# Patient Record
Sex: MALE | Race: BLACK OR AFRICAN AMERICAN | NOT HISPANIC OR LATINO | Employment: FULL TIME | URBAN - METROPOLITAN AREA
[De-identification: names, ages, dates, MRNs, and addresses within clinical notes are randomized per-mention and may not be internally consistent; named-entity substitution may affect disease eponyms.]

---

## 2022-09-15 ENCOUNTER — ANESTHESIA (OUTPATIENT)
Dept: SURGERY | Facility: CLINIC | Age: 32
End: 2022-09-15
Payer: COMMERCIAL

## 2022-09-15 ENCOUNTER — APPOINTMENT (OUTPATIENT)
Dept: ULTRASOUND IMAGING | Facility: CLINIC | Age: 32
End: 2022-09-15
Attending: EMERGENCY MEDICINE
Payer: COMMERCIAL

## 2022-09-15 ENCOUNTER — APPOINTMENT (OUTPATIENT)
Dept: GENERAL RADIOLOGY | Facility: CLINIC | Age: 32
End: 2022-09-15
Attending: EMERGENCY MEDICINE
Payer: COMMERCIAL

## 2022-09-15 ENCOUNTER — HOSPITAL ENCOUNTER (OUTPATIENT)
Facility: CLINIC | Age: 32
Setting detail: OBSERVATION
Discharge: HOME | End: 2022-09-16
Attending: EMERGENCY MEDICINE | Admitting: INTERNAL MEDICINE
Payer: COMMERCIAL

## 2022-09-15 ENCOUNTER — ANESTHESIA EVENT (OUTPATIENT)
Dept: SURGERY | Facility: CLINIC | Age: 32
End: 2022-09-15
Payer: COMMERCIAL

## 2022-09-15 DIAGNOSIS — K80.00 CALCULUS OF GALLBLADDER WITH ACUTE CHOLECYSTITIS WITHOUT OBSTRUCTION: ICD-10-CM

## 2022-09-15 DIAGNOSIS — K80.50 BILIARY COLIC: ICD-10-CM

## 2022-09-15 DIAGNOSIS — K80.20 SYMPTOMATIC CHOLELITHIASIS: Primary | ICD-10-CM

## 2022-09-15 LAB
ALBUMIN SERPL-MCNC: 4.2 G/DL (ref 3.4–5)
ALP SERPL-CCNC: 49 U/L (ref 40–150)
ALT SERPL W P-5'-P-CCNC: 37 U/L (ref 0–70)
ANION GAP SERPL CALCULATED.3IONS-SCNC: 6 MMOL/L (ref 3–14)
AST SERPL W P-5'-P-CCNC: 20 U/L (ref 0–45)
ATRIAL RATE - MUSE: 68 BPM
BASOPHILS # BLD AUTO: 0 10E3/UL (ref 0–0.2)
BASOPHILS NFR BLD AUTO: 0 %
BILIRUB SERPL-MCNC: 0.6 MG/DL (ref 0.2–1.3)
BUN SERPL-MCNC: 17 MG/DL (ref 7–30)
CALCIUM SERPL-MCNC: 9.6 MG/DL (ref 8.5–10.1)
CHLORIDE BLD-SCNC: 105 MMOL/L (ref 94–109)
CO2 SERPL-SCNC: 27 MMOL/L (ref 20–32)
CREAT SERPL-MCNC: 1.1 MG/DL (ref 0.66–1.25)
DIASTOLIC BLOOD PRESSURE - MUSE: NORMAL MMHG
EOSINOPHIL # BLD AUTO: 0.1 10E3/UL (ref 0–0.7)
EOSINOPHIL NFR BLD AUTO: 1 %
ERYTHROCYTE [DISTWIDTH] IN BLOOD BY AUTOMATED COUNT: 12.6 % (ref 10–15)
GFR SERPL CREATININE-BSD FRML MDRD: >90 ML/MIN/1.73M2
GLUCOSE BLD-MCNC: 119 MG/DL (ref 70–99)
HCT VFR BLD AUTO: 42.7 % (ref 40–53)
HGB BLD-MCNC: 14.3 G/DL (ref 13.3–17.7)
IMM GRANULOCYTES # BLD: 0 10E3/UL
IMM GRANULOCYTES NFR BLD: 0 %
INTERPRETATION ECG - MUSE: NORMAL
LIPASE SERPL-CCNC: 108 U/L (ref 73–393)
LYMPHOCYTES # BLD AUTO: 2.8 10E3/UL (ref 0.8–5.3)
LYMPHOCYTES NFR BLD AUTO: 49 %
MCH RBC QN AUTO: 26 PG (ref 26.5–33)
MCHC RBC AUTO-ENTMCNC: 33.5 G/DL (ref 31.5–36.5)
MCV RBC AUTO: 78 FL (ref 78–100)
MONOCYTES # BLD AUTO: 0.4 10E3/UL (ref 0–1.3)
MONOCYTES NFR BLD AUTO: 6 %
NEUTROPHILS # BLD AUTO: 2.6 10E3/UL (ref 1.6–8.3)
NEUTROPHILS NFR BLD AUTO: 44 %
NRBC # BLD AUTO: 0 10E3/UL
NRBC BLD AUTO-RTO: 0 /100
P AXIS - MUSE: 49 DEGREES
PLATELET # BLD AUTO: 240 10E3/UL (ref 150–450)
POTASSIUM BLD-SCNC: 3.6 MMOL/L (ref 3.4–5.3)
PR INTERVAL - MUSE: 170 MS
PROT SERPL-MCNC: 7.5 G/DL (ref 6.8–8.8)
QRS DURATION - MUSE: 144 MS
QT - MUSE: 422 MS
QTC - MUSE: 448 MS
R AXIS - MUSE: 70 DEGREES
RBC # BLD AUTO: 5.5 10E6/UL (ref 4.4–5.9)
SARS-COV-2 RNA RESP QL NAA+PROBE: NEGATIVE
SODIUM SERPL-SCNC: 138 MMOL/L (ref 133–144)
SYSTOLIC BLOOD PRESSURE - MUSE: NORMAL MMHG
T AXIS - MUSE: 42 DEGREES
TROPONIN I SERPL HS-MCNC: <3 NG/L
VENTRICULAR RATE- MUSE: 68 BPM
WBC # BLD AUTO: 5.9 10E3/UL (ref 4–11)

## 2022-09-15 PROCEDURE — 88304 TISSUE EXAM BY PATHOLOGIST: CPT | Mod: TC | Performed by: SURGERY

## 2022-09-15 PROCEDURE — 250N000009 HC RX 250: Performed by: SURGERY

## 2022-09-15 PROCEDURE — 83690 ASSAY OF LIPASE: CPT | Performed by: EMERGENCY MEDICINE

## 2022-09-15 PROCEDURE — 96374 THER/PROPH/DIAG INJ IV PUSH: CPT | Mod: XU

## 2022-09-15 PROCEDURE — 36415 COLL VENOUS BLD VENIPUNCTURE: CPT | Performed by: EMERGENCY MEDICINE

## 2022-09-15 PROCEDURE — 71046 X-RAY EXAM CHEST 2 VIEWS: CPT

## 2022-09-15 PROCEDURE — 999N000141 HC STATISTIC PRE-PROCEDURE NURSING ASSESSMENT: Performed by: SURGERY

## 2022-09-15 PROCEDURE — 250N000013 HC RX MED GY IP 250 OP 250 PS 637: Performed by: NURSE ANESTHETIST, CERTIFIED REGISTERED

## 2022-09-15 PROCEDURE — 47562 LAPAROSCOPIC CHOLECYSTECTOMY: CPT | Mod: AS | Performed by: PHYSICIAN ASSISTANT

## 2022-09-15 PROCEDURE — 258N000003 HC RX IP 258 OP 636: Performed by: ANESTHESIOLOGY

## 2022-09-15 PROCEDURE — 258N000001 HC RX 258: Performed by: SURGERY

## 2022-09-15 PROCEDURE — 250N000009 HC RX 250: Performed by: NURSE ANESTHETIST, CERTIFIED REGISTERED

## 2022-09-15 PROCEDURE — 258N000003 HC RX IP 258 OP 636: Performed by: EMERGENCY MEDICINE

## 2022-09-15 PROCEDURE — 272N000001 HC OR GENERAL SUPPLY STERILE: Performed by: SURGERY

## 2022-09-15 PROCEDURE — 258N000003 HC RX IP 258 OP 636: Performed by: INTERNAL MEDICINE

## 2022-09-15 PROCEDURE — 370N000017 HC ANESTHESIA TECHNICAL FEE, PER MIN: Performed by: SURGERY

## 2022-09-15 PROCEDURE — 82040 ASSAY OF SERUM ALBUMIN: CPT | Performed by: EMERGENCY MEDICINE

## 2022-09-15 PROCEDURE — G0378 HOSPITAL OBSERVATION PER HR: HCPCS

## 2022-09-15 PROCEDURE — 47562 LAPAROSCOPIC CHOLECYSTECTOMY: CPT | Performed by: SURGERY

## 2022-09-15 PROCEDURE — 710N000009 HC RECOVERY PHASE 1, LEVEL 1, PER MIN: Performed by: SURGERY

## 2022-09-15 PROCEDURE — 250N000011 HC RX IP 250 OP 636: Performed by: NURSE ANESTHETIST, CERTIFIED REGISTERED

## 2022-09-15 PROCEDURE — 258N000003 HC RX IP 258 OP 636: Performed by: NURSE ANESTHETIST, CERTIFIED REGISTERED

## 2022-09-15 PROCEDURE — 99219 PR INITIAL OBSERVATION CARE,LEVEL II: CPT | Performed by: INTERNAL MEDICINE

## 2022-09-15 PROCEDURE — 250N000013 HC RX MED GY IP 250 OP 250 PS 637: Performed by: INTERNAL MEDICINE

## 2022-09-15 PROCEDURE — 85025 COMPLETE CBC W/AUTO DIFF WBC: CPT | Performed by: EMERGENCY MEDICINE

## 2022-09-15 PROCEDURE — 84484 ASSAY OF TROPONIN QUANT: CPT | Performed by: EMERGENCY MEDICINE

## 2022-09-15 PROCEDURE — 250N000011 HC RX IP 250 OP 636: Performed by: ANESTHESIOLOGY

## 2022-09-15 PROCEDURE — 250N000011 HC RX IP 250 OP 636: Performed by: EMERGENCY MEDICINE

## 2022-09-15 PROCEDURE — 250N000011 HC RX IP 250 OP 636: Performed by: PHYSICIAN ASSISTANT

## 2022-09-15 PROCEDURE — 96376 TX/PRO/DX INJ SAME DRUG ADON: CPT

## 2022-09-15 PROCEDURE — 96375 TX/PRO/DX INJ NEW DRUG ADDON: CPT

## 2022-09-15 PROCEDURE — 76705 ECHO EXAM OF ABDOMEN: CPT

## 2022-09-15 PROCEDURE — 360N000076 HC SURGERY LEVEL 3, PER MIN: Performed by: SURGERY

## 2022-09-15 PROCEDURE — 99207 PR NO CHARGE LOS: CPT | Performed by: INTERNAL MEDICINE

## 2022-09-15 PROCEDURE — 93005 ELECTROCARDIOGRAM TRACING: CPT | Mod: XU

## 2022-09-15 PROCEDURE — 250N000025 HC SEVOFLURANE, PER MIN: Performed by: SURGERY

## 2022-09-15 PROCEDURE — 250N000011 HC RX IP 250 OP 636: Performed by: INTERNAL MEDICINE

## 2022-09-15 PROCEDURE — 99204 OFFICE O/P NEW MOD 45 MIN: CPT | Mod: 57 | Performed by: SURGERY

## 2022-09-15 PROCEDURE — C9803 HOPD COVID-19 SPEC COLLECT: HCPCS

## 2022-09-15 PROCEDURE — 80053 COMPREHEN METABOLIC PANEL: CPT | Performed by: EMERGENCY MEDICINE

## 2022-09-15 PROCEDURE — U0003 INFECTIOUS AGENT DETECTION BY NUCLEIC ACID (DNA OR RNA); SEVERE ACUTE RESPIRATORY SYNDROME CORONAVIRUS 2 (SARS-COV-2) (CORONAVIRUS DISEASE [COVID-19]), AMPLIFIED PROBE TECHNIQUE, MAKING USE OF HIGH THROUGHPUT TECHNOLOGIES AS DESCRIBED BY CMS-2020-01-R: HCPCS | Performed by: INTERNAL MEDICINE

## 2022-09-15 PROCEDURE — 99285 EMERGENCY DEPT VISIT HI MDM: CPT | Mod: 25

## 2022-09-15 PROCEDURE — 96361 HYDRATE IV INFUSION ADD-ON: CPT | Mod: XU

## 2022-09-15 RX ORDER — ALBUTEROL SULFATE 90 UG/1
AEROSOL, METERED RESPIRATORY (INHALATION) PRN
Status: DISCONTINUED | OUTPATIENT
Start: 2022-09-15 | End: 2022-09-15

## 2022-09-15 RX ORDER — GLYCOPYRROLATE 0.2 MG/ML
INJECTION, SOLUTION INTRAMUSCULAR; INTRAVENOUS PRN
Status: DISCONTINUED | OUTPATIENT
Start: 2022-09-15 | End: 2022-09-15

## 2022-09-15 RX ORDER — ONDANSETRON 4 MG/1
4 TABLET, ORALLY DISINTEGRATING ORAL EVERY 30 MIN PRN
Status: DISCONTINUED | OUTPATIENT
Start: 2022-09-15 | End: 2022-09-15 | Stop reason: HOSPADM

## 2022-09-15 RX ORDER — MAGNESIUM HYDROXIDE 1200 MG/15ML
LIQUID ORAL PRN
Status: DISCONTINUED | OUTPATIENT
Start: 2022-09-15 | End: 2022-09-15 | Stop reason: HOSPADM

## 2022-09-15 RX ORDER — NALOXONE HYDROCHLORIDE 0.4 MG/ML
0.4 INJECTION, SOLUTION INTRAMUSCULAR; INTRAVENOUS; SUBCUTANEOUS
Status: DISCONTINUED | OUTPATIENT
Start: 2022-09-15 | End: 2022-09-16 | Stop reason: HOSPADM

## 2022-09-15 RX ORDER — OXYCODONE HYDROCHLORIDE 5 MG/1
5 TABLET ORAL EVERY 4 HOURS PRN
Status: DISCONTINUED | OUTPATIENT
Start: 2022-09-15 | End: 2022-09-15 | Stop reason: HOSPADM

## 2022-09-15 RX ORDER — ACETAMINOPHEN 325 MG/1
650 TABLET ORAL EVERY 6 HOURS PRN
Status: DISCONTINUED | OUTPATIENT
Start: 2022-09-15 | End: 2022-09-16 | Stop reason: HOSPADM

## 2022-09-15 RX ORDER — FENTANYL CITRATE 50 UG/ML
25 INJECTION, SOLUTION INTRAMUSCULAR; INTRAVENOUS EVERY 5 MIN PRN
Status: DISCONTINUED | OUTPATIENT
Start: 2022-09-15 | End: 2022-09-15 | Stop reason: HOSPADM

## 2022-09-15 RX ORDER — HYDROMORPHONE HCL IN WATER/PF 6 MG/30 ML
0.4 PATIENT CONTROLLED ANALGESIA SYRINGE INTRAVENOUS EVERY 5 MIN PRN
Status: DISCONTINUED | OUTPATIENT
Start: 2022-09-15 | End: 2022-09-15 | Stop reason: HOSPADM

## 2022-09-15 RX ORDER — DIPHENHYDRAMINE HYDROCHLORIDE 50 MG/ML
25 INJECTION INTRAMUSCULAR; INTRAVENOUS ONCE
Status: COMPLETED | OUTPATIENT
Start: 2022-09-15 | End: 2022-09-15

## 2022-09-15 RX ORDER — ASPIRIN 81 MG/1
81 TABLET, CHEWABLE ORAL DAILY PRN
COMMUNITY

## 2022-09-15 RX ORDER — ONDANSETRON 2 MG/ML
4 INJECTION INTRAMUSCULAR; INTRAVENOUS EVERY 30 MIN PRN
Status: DISCONTINUED | OUTPATIENT
Start: 2022-09-15 | End: 2022-09-15

## 2022-09-15 RX ORDER — CEFAZOLIN SODIUM/WATER 2 G/20 ML
2 SYRINGE (ML) INTRAVENOUS
Status: COMPLETED | OUTPATIENT
Start: 2022-09-15 | End: 2022-09-15

## 2022-09-15 RX ORDER — HYDROMORPHONE HYDROCHLORIDE 1 MG/ML
0.5 INJECTION, SOLUTION INTRAMUSCULAR; INTRAVENOUS; SUBCUTANEOUS
Status: COMPLETED | OUTPATIENT
Start: 2022-09-15 | End: 2022-09-15

## 2022-09-15 RX ORDER — SODIUM CHLORIDE, SODIUM LACTATE, POTASSIUM CHLORIDE, CALCIUM CHLORIDE 600; 310; 30; 20 MG/100ML; MG/100ML; MG/100ML; MG/100ML
INJECTION, SOLUTION INTRAVENOUS CONTINUOUS
Status: DISCONTINUED | OUTPATIENT
Start: 2022-09-15 | End: 2022-09-15 | Stop reason: HOSPADM

## 2022-09-15 RX ORDER — METOCLOPRAMIDE HYDROCHLORIDE 5 MG/ML
10 INJECTION INTRAMUSCULAR; INTRAVENOUS ONCE
Status: COMPLETED | OUTPATIENT
Start: 2022-09-15 | End: 2022-09-15

## 2022-09-15 RX ORDER — NEOSTIGMINE METHYLSULFATE 1 MG/ML
VIAL (ML) INJECTION PRN
Status: DISCONTINUED | OUTPATIENT
Start: 2022-09-15 | End: 2022-09-15

## 2022-09-15 RX ORDER — PROPOFOL 10 MG/ML
INJECTION, EMULSION INTRAVENOUS PRN
Status: DISCONTINUED | OUTPATIENT
Start: 2022-09-15 | End: 2022-09-15

## 2022-09-15 RX ORDER — SODIUM CHLORIDE 9 MG/ML
INJECTION, SOLUTION INTRAVENOUS CONTINUOUS
Status: DISCONTINUED | OUTPATIENT
Start: 2022-09-15 | End: 2022-09-15

## 2022-09-15 RX ORDER — ACETAMINOPHEN 650 MG/1
650 SUPPOSITORY RECTAL EVERY 6 HOURS PRN
Status: DISCONTINUED | OUTPATIENT
Start: 2022-09-15 | End: 2022-09-16 | Stop reason: HOSPADM

## 2022-09-15 RX ORDER — ONDANSETRON 2 MG/ML
INJECTION INTRAMUSCULAR; INTRAVENOUS PRN
Status: DISCONTINUED | OUTPATIENT
Start: 2022-09-15 | End: 2022-09-15

## 2022-09-15 RX ORDER — DEXAMETHASONE SODIUM PHOSPHATE 4 MG/ML
INJECTION, SOLUTION INTRA-ARTICULAR; INTRALESIONAL; INTRAMUSCULAR; INTRAVENOUS; SOFT TISSUE PRN
Status: DISCONTINUED | OUTPATIENT
Start: 2022-09-15 | End: 2022-09-15

## 2022-09-15 RX ORDER — CEFAZOLIN SODIUM/WATER 2 G/20 ML
2 SYRINGE (ML) INTRAVENOUS SEE ADMIN INSTRUCTIONS
Status: DISCONTINUED | OUTPATIENT
Start: 2022-09-15 | End: 2022-09-15 | Stop reason: HOSPADM

## 2022-09-15 RX ORDER — OXYCODONE HYDROCHLORIDE 5 MG/1
5-10 TABLET ORAL EVERY 6 HOURS PRN
Status: DISCONTINUED | OUTPATIENT
Start: 2022-09-15 | End: 2022-09-16 | Stop reason: HOSPADM

## 2022-09-15 RX ORDER — ONDANSETRON 2 MG/ML
4 INJECTION INTRAMUSCULAR; INTRAVENOUS EVERY 6 HOURS PRN
Status: DISCONTINUED | OUTPATIENT
Start: 2022-09-15 | End: 2022-09-16 | Stop reason: HOSPADM

## 2022-09-15 RX ORDER — OXYCODONE HYDROCHLORIDE 5 MG/1
5 TABLET ORAL EVERY 4 HOURS PRN
Qty: 10 TABLET | Refills: 0 | Status: SHIPPED | OUTPATIENT
Start: 2022-09-15

## 2022-09-15 RX ORDER — LABETALOL HYDROCHLORIDE 5 MG/ML
10 INJECTION, SOLUTION INTRAVENOUS
Status: DISCONTINUED | OUTPATIENT
Start: 2022-09-15 | End: 2022-09-15 | Stop reason: HOSPADM

## 2022-09-15 RX ORDER — PROCHLORPERAZINE MALEATE 10 MG
10 TABLET ORAL EVERY 6 HOURS PRN
Status: DISCONTINUED | OUTPATIENT
Start: 2022-09-15 | End: 2022-09-16 | Stop reason: HOSPADM

## 2022-09-15 RX ORDER — PROCHLORPERAZINE 25 MG
25 SUPPOSITORY, RECTAL RECTAL EVERY 12 HOURS PRN
Status: DISCONTINUED | OUTPATIENT
Start: 2022-09-15 | End: 2022-09-16 | Stop reason: HOSPADM

## 2022-09-15 RX ORDER — ALBUTEROL SULFATE 90 UG/1
2 AEROSOL, METERED RESPIRATORY (INHALATION) EVERY 6 HOURS PRN
Status: DISCONTINUED | OUTPATIENT
Start: 2022-09-15 | End: 2022-09-16 | Stop reason: HOSPADM

## 2022-09-15 RX ORDER — NALOXONE HYDROCHLORIDE 0.4 MG/ML
0.2 INJECTION, SOLUTION INTRAMUSCULAR; INTRAVENOUS; SUBCUTANEOUS
Status: DISCONTINUED | OUTPATIENT
Start: 2022-09-15 | End: 2022-09-16 | Stop reason: HOSPADM

## 2022-09-15 RX ORDER — ASPIRIN 81 MG/1
81 TABLET, CHEWABLE ORAL DAILY PRN
Status: DISCONTINUED | OUTPATIENT
Start: 2022-09-15 | End: 2022-09-16 | Stop reason: HOSPADM

## 2022-09-15 RX ORDER — ONDANSETRON 2 MG/ML
4 INJECTION INTRAMUSCULAR; INTRAVENOUS EVERY 30 MIN PRN
Status: DISCONTINUED | OUTPATIENT
Start: 2022-09-15 | End: 2022-09-15 | Stop reason: HOSPADM

## 2022-09-15 RX ORDER — AMOXICILLIN 250 MG
1 CAPSULE ORAL 2 TIMES DAILY PRN
Status: DISCONTINUED | OUTPATIENT
Start: 2022-09-15 | End: 2022-09-16 | Stop reason: HOSPADM

## 2022-09-15 RX ORDER — ALBUTEROL SULFATE 90 UG/1
2 AEROSOL, METERED RESPIRATORY (INHALATION) EVERY 6 HOURS PRN
COMMUNITY

## 2022-09-15 RX ORDER — FENTANYL CITRATE 50 UG/ML
INJECTION, SOLUTION INTRAMUSCULAR; INTRAVENOUS PRN
Status: DISCONTINUED | OUTPATIENT
Start: 2022-09-15 | End: 2022-09-15

## 2022-09-15 RX ORDER — AMOXICILLIN 250 MG
2 CAPSULE ORAL 2 TIMES DAILY PRN
Status: DISCONTINUED | OUTPATIENT
Start: 2022-09-15 | End: 2022-09-16 | Stop reason: HOSPADM

## 2022-09-15 RX ORDER — HYDRALAZINE HYDROCHLORIDE 20 MG/ML
2.5-5 INJECTION INTRAMUSCULAR; INTRAVENOUS EVERY 10 MIN PRN
Status: DISCONTINUED | OUTPATIENT
Start: 2022-09-15 | End: 2022-09-15 | Stop reason: HOSPADM

## 2022-09-15 RX ORDER — HYDROMORPHONE HYDROCHLORIDE 1 MG/ML
0.5 INJECTION, SOLUTION INTRAMUSCULAR; INTRAVENOUS; SUBCUTANEOUS
Status: DISCONTINUED | OUTPATIENT
Start: 2022-09-15 | End: 2022-09-16 | Stop reason: HOSPADM

## 2022-09-15 RX ORDER — ELECTROLYTES/DEXTROSE
1 SOLUTION, ORAL ORAL DAILY
COMMUNITY

## 2022-09-15 RX ORDER — LIDOCAINE HYDROCHLORIDE 20 MG/ML
INJECTION, SOLUTION INFILTRATION; PERINEURAL PRN
Status: DISCONTINUED | OUTPATIENT
Start: 2022-09-15 | End: 2022-09-15

## 2022-09-15 RX ORDER — BUPIVACAINE HYDROCHLORIDE AND EPINEPHRINE 2.5; 5 MG/ML; UG/ML
INJECTION, SOLUTION INFILTRATION; PERINEURAL PRN
Status: DISCONTINUED | OUTPATIENT
Start: 2022-09-15 | End: 2022-09-15 | Stop reason: HOSPADM

## 2022-09-15 RX ADMIN — HYDROMORPHONE HYDROCHLORIDE 0.5 MG: 1 INJECTION, SOLUTION INTRAMUSCULAR; INTRAVENOUS; SUBCUTANEOUS at 18:04

## 2022-09-15 RX ADMIN — LIDOCAINE HYDROCHLORIDE 100 MG: 20 INJECTION, SOLUTION INFILTRATION; PERINEURAL at 13:12

## 2022-09-15 RX ADMIN — PROPOFOL 25 MG: 10 INJECTION, EMULSION INTRAVENOUS at 13:12

## 2022-09-15 RX ADMIN — MIDAZOLAM 2 MG: 1 INJECTION INTRAMUSCULAR; INTRAVENOUS at 13:06

## 2022-09-15 RX ADMIN — Medication 2 G: at 13:06

## 2022-09-15 RX ADMIN — NEOSTIGMINE METHYLSULFATE 5 MG: 1 INJECTION, SOLUTION INTRAVENOUS at 13:59

## 2022-09-15 RX ADMIN — ALBUTEROL SULFATE 6 PUFF: 90 AEROSOL, METERED RESPIRATORY (INHALATION) at 13:58

## 2022-09-15 RX ADMIN — FAMOTIDINE 20 MG: 10 INJECTION INTRAVENOUS at 03:59

## 2022-09-15 RX ADMIN — SODIUM CHLORIDE 1000 ML: 9 INJECTION, SOLUTION INTRAVENOUS at 04:01

## 2022-09-15 RX ADMIN — DEXAMETHASONE SODIUM PHOSPHATE 4 MG: 4 INJECTION, SOLUTION INTRA-ARTICULAR; INTRALESIONAL; INTRAMUSCULAR; INTRAVENOUS; SOFT TISSUE at 13:20

## 2022-09-15 RX ADMIN — ONDANSETRON 4 MG: 2 INJECTION INTRAMUSCULAR; INTRAVENOUS at 04:00

## 2022-09-15 RX ADMIN — ONDANSETRON 4 MG: 2 INJECTION INTRAMUSCULAR; INTRAVENOUS at 13:50

## 2022-09-15 RX ADMIN — HYDROMORPHONE HYDROCHLORIDE 0.5 MG: 1 INJECTION, SOLUTION INTRAMUSCULAR; INTRAVENOUS; SUBCUTANEOUS at 05:36

## 2022-09-15 RX ADMIN — FENTANYL CITRATE 50 MCG: 50 INJECTION, SOLUTION INTRAMUSCULAR; INTRAVENOUS at 13:37

## 2022-09-15 RX ADMIN — PHENYLEPHRINE HYDROCHLORIDE 100 MCG: 10 INJECTION INTRAVENOUS at 13:44

## 2022-09-15 RX ADMIN — FENTANYL CITRATE 25 MCG: 50 INJECTION, SOLUTION INTRAMUSCULAR; INTRAVENOUS at 14:54

## 2022-09-15 RX ADMIN — SODIUM CHLORIDE, POTASSIUM CHLORIDE, SODIUM LACTATE AND CALCIUM CHLORIDE: 600; 310; 30; 20 INJECTION, SOLUTION INTRAVENOUS at 13:06

## 2022-09-15 RX ADMIN — DIPHENHYDRAMINE HYDROCHLORIDE 25 MG: 50 INJECTION, SOLUTION INTRAMUSCULAR; INTRAVENOUS at 06:42

## 2022-09-15 RX ADMIN — FENTANYL CITRATE 25 MCG: 50 INJECTION, SOLUTION INTRAMUSCULAR; INTRAVENOUS at 14:45

## 2022-09-15 RX ADMIN — FENTANYL CITRATE 50 MCG: 50 INJECTION, SOLUTION INTRAMUSCULAR; INTRAVENOUS at 13:12

## 2022-09-15 RX ADMIN — METOCLOPRAMIDE 10 MG: 5 INJECTION, SOLUTION INTRAMUSCULAR; INTRAVENOUS at 06:42

## 2022-09-15 RX ADMIN — HYDROMORPHONE HYDROCHLORIDE 0.5 MG: 1 INJECTION, SOLUTION INTRAMUSCULAR; INTRAVENOUS; SUBCUTANEOUS at 04:00

## 2022-09-15 RX ADMIN — ROCURONIUM BROMIDE 50 MG: 50 INJECTION, SOLUTION INTRAVENOUS at 13:12

## 2022-09-15 RX ADMIN — GLYCOPYRROLATE 0.8 MG: 0.2 INJECTION, SOLUTION INTRAMUSCULAR; INTRAVENOUS at 13:59

## 2022-09-15 RX ADMIN — HYDROMORPHONE HYDROCHLORIDE 0.5 MG: 1 INJECTION, SOLUTION INTRAMUSCULAR; INTRAVENOUS; SUBCUTANEOUS at 04:27

## 2022-09-15 RX ADMIN — SODIUM CHLORIDE, POTASSIUM CHLORIDE, SODIUM LACTATE AND CALCIUM CHLORIDE: 600; 310; 30; 20 INJECTION, SOLUTION INTRAVENOUS at 13:40

## 2022-09-15 RX ADMIN — PHENYLEPHRINE HYDROCHLORIDE 100 MCG: 10 INJECTION INTRAVENOUS at 13:45

## 2022-09-15 RX ADMIN — SODIUM CHLORIDE: 9 INJECTION, SOLUTION INTRAVENOUS at 07:47

## 2022-09-15 RX ADMIN — ACETAMINOPHEN 650 MG: 325 TABLET, FILM COATED ORAL at 22:18

## 2022-09-15 RX ADMIN — PHENYLEPHRINE HYDROCHLORIDE 100 MCG: 10 INJECTION INTRAVENOUS at 13:43

## 2022-09-15 RX ADMIN — ONDANSETRON 4 MG: 2 INJECTION INTRAMUSCULAR; INTRAVENOUS at 04:27

## 2022-09-15 ASSESSMENT — ACTIVITIES OF DAILY LIVING (ADL)
ADLS_ACUITY_SCORE: 35

## 2022-09-15 ASSESSMENT — ENCOUNTER SYMPTOMS: ABDOMINAL PAIN: 1

## 2022-09-15 ASSESSMENT — LIFESTYLE VARIABLES: TOBACCO_USE: 0

## 2022-09-15 NOTE — OP NOTE
General Surgery Operative Note    PREOPERATIVE DIAGNOSIS:  Symptomatic cholelithiasis [K80.20]    POSTOPERATIVE DIAGNOSIS:  Same, acute cholecystitis    PROCEDURE:  CHOLECYSTECTOMY, LAPAROSCOPIC    ANESTHESIA:  General.    PREOPERATIVE MEDICATIONS:  Ancef IV.    SURGEON:  Juan Smith MD    ASSISTANT:  Kanu Martinez PA-C, Physician assistant first assistant was necessary during the performance of this procedure for expertise in patient positioning, prepping, draping, trocar placement, camera management, retraction and exposure, and suctioning.    INDICATIONS: Patient presented with signs and symptoms consistent with biliary colic and possible cholecystitis.  Extensive discussion was undertaken regarding the procedure of cholecystectomy.  The potential risks of bleeding, infection, bile duct or visceral injury were thoroughly reviewed.  All of the patient's questions were answered.  The patient wishes to proceed with cholecystectomy.    PROCEDURE:  After informed consent was obtained the patient was taken to the operating suite and uneventfully endotracheally intubated.  The abdomen was prepped and draped in a sterile fashion.  Surgeon initiated timeout was acknowledged.  After infiltration with local anesthestic a curvilinear incision was made in the infraumbilical position and through this a Verees needle was passed intraperitoneally.  Position was confirmed using the saline drop test. A CO2 pneumoperitoneum was then created.  After adequate insufflation the needle was removed and replaced with an 11mm trocar .  Two other trocars were placed under laparoscopic visualization following the infiltration of local anesthetic.  We elevated the liver and were able to identify the gallbladder.  The gallbladder was tense and distended.  An aspirating needle and syringe were used to decompress it.  There were obvious signs of acute cholecystitis with significant surrounding tissue edema.  The gallbladder was grasped  and used to elevate the liver further.  I began dissecting out some fatty adhesions down near the neck of the gallbladder until a cystic duct was encountered.  I continued the dissection using combination of sharp and blunt dissection until the cystic duct was largely dissected out.  I continued the dissection up along the sides of the gallbladder, both medially and laterally, until I had created a space between the gallbladder and the liver.  At this point, I encountered the cystic artery, just posterior and lateral to the cystic duct.  This again was dissected out.  Once I had created a window where only the cystic artery and duct were noted to be entering the gallbladder, I felt that this represented our critical view.  The cystic artery and duct were then doubly clipped and divided.  I continued the dissection up along the body of the gallbladder, freeing all attachments and adhesions of the gallbladder to the liver.  Gallbladder was removed from the liver in an atraumatic fashion.  The gallbladder was then placed in a retrieval pouch and removed from the abdomen.  The gallbladder fossa was reinspected, and all areas of bleeding were managed with electrocautery.  I irrigated the area with normal saline and aspirated it out.  I then reinspected the abdomen, and everything appeared to be in pristine condition. The trocars were removed and carbon dioxide was massaged from the abdomen. Local anesthetic was injected. Fascia at the 11mm port site was closed with 0 vicryl suture.  Skin incisions were closed with subcuticular 4-0 Monocryl sutures.  The patient tolerated this procedure without difficulty. Needle and sponge counts were correct. The patient was taken from the operating room To the recovery room in a stable condition.      ESTIMATED BLOOD LOSS: 10cc    Juan Smith MD

## 2022-09-15 NOTE — ANESTHESIA PREPROCEDURE EVALUATION
Anesthesia Pre-Procedure Evaluation    Patient: Bryn Power   MRN: [id number] : 1990        Procedure : Procedure(s):  CHOLECYSTECTOMY, LAPAROSCOPIC          Past Medical History:   Diagnosis Date     Uncomplicated asthma       History reviewed. No pertinent surgical history.   No Known Allergies   Social History     Tobacco Use     Smoking status: Never Smoker     Smokeless tobacco: Never Used   Substance Use Topics     Alcohol use: Yes      Wt Readings from Last 1 Encounters:   09/15/22 97.1 kg (214 lb)        Anesthesia Evaluation   Pt has not had prior anesthetic         ROS/MED HX  ENT/Pulmonary:     (+) asthma Treatment: Inhaler prn,   (-) tobacco use and sleep apnea   Neurologic:  - neg neurologic ROS     Cardiovascular:  - neg cardiovascular ROS     METS/Exercise Tolerance:     Hematologic:       Musculoskeletal:       GI/Hepatic:     (+) cholecystitis/cholelithiasis,  (-) GERD   Renal/Genitourinary:  - neg Renal ROS     Endo:     (+) Obesity,  (-) Type II DM   Psychiatric/Substance Use:       Infectious Disease:       Malignancy:       Other:            Physical Exam    Airway        Mallampati: III   TM distance: > 3 FB   Neck ROM: full   Mouth opening: > 3 cm    Respiratory Devices and Support         Dental  no notable dental history         Cardiovascular   cardiovascular exam normal          Pulmonary   pulmonary exam normal                OUTSIDE LABS:  CBC:   Lab Results   Component Value Date    WBC 5.9 09/15/2022    HGB 14.3 09/15/2022    HCT 42.7 09/15/2022     09/15/2022     BMP:   Lab Results   Component Value Date     09/15/2022    POTASSIUM 3.6 09/15/2022    CHLORIDE 105 09/15/2022    CO2 27 09/15/2022    BUN 17 09/15/2022    CR 1.10 09/15/2022     (H) 09/15/2022     COAGS: No results found for: PTT, INR, FIBR  POC: No results found for: BGM, HCG, HCGS  HEPATIC:   Lab Results   Component Value Date    ALBUMIN 4.2 09/15/2022    PROTTOTAL 7.5 09/15/2022     ALT 37 09/15/2022    AST 20 09/15/2022    ALKPHOS 49 09/15/2022    BILITOTAL 0.6 09/15/2022     OTHER:   Lab Results   Component Value Date    GO 9.6 09/15/2022    LIPASE 108 09/15/2022       Anesthesia Plan    ASA Status:  2   NPO Status:  NPO Appropriate    Anesthesia Type: General.     - Airway: ETT   Induction: Intravenous, Propofol.   Maintenance: Balanced.        Consents    Anesthesia Plan(s) and associated risks, benefits, and realistic alternatives discussed. Questions answered and patient/representative(s) expressed understanding.    - Discussed:     - Discussed with:  Patient         Postoperative Care    Pain management: IV analgesics.   PONV prophylaxis: Ondansetron (or other 5HT-3), Background Propofol Infusion     Comments:                Trudi Mills MD

## 2022-09-15 NOTE — PROGRESS NOTES
RECEIVING UNIT ED HANDOFF REVIEW    ED Nurse Handoff Report was reviewed by: Lata Brunson RN on September 15, 2022 at 9:54 AM

## 2022-09-15 NOTE — INTERVAL H&P NOTE
"I have reviewed the surgical (or preoperative) H&P that is linked to this encounter, and examined the patient. There are no significant changes    Clinical Conditions Present on Arrival:  Clinically Significant Risk Factors Present on Admission                   # Obesity: Estimated body mass index is 32.54 kg/m  as calculated from the following:    Height as of this encounter: 1.727 m (5' 8\").    Weight as of this encounter: 97.1 kg (214 lb).       "

## 2022-09-15 NOTE — CONSULTS
General Surgery Consultation  We are asked by Dr. Julián Virgen to see Bryn Power in consultation regarding biliary colic, gallstones.    Bryn Power  YOB: 1990    Age: 32 year old  MRN#: [id number]    Date of Admission: 9/15/2022  Surgeon:   Juan Smith MD                  Chief Complaint:   Abdominal pain         History of Present Illness:   This patient is a 32 year old male with a history of asthma who presented to the [facility] ED early this morning for evaluation of chest / upper abdominal pain with associated nausea and vomiting. Neal is a  who lives in [place]. He was staying in [place] overnight for work and had chicken wings for dinner last night. He felt fine after eating, then during the night woke up with severe pain that seemed high up in his abdomen and in his chest. He subsequently developed nausea and vomiting. He has been in his regular state of health lately, no dietary changes, no fevers or chills.   Denies having any previous episodes of this. No personal history of surgery or anesthesia. History is obtained from the patient and chart. Patient denies any known personal or family history of bleeding disorder, clotting disorder, anesthesia reaction, obstructive sleep apnea.     Since being in the ED, Bryn states that the pain has gotten a bit better with medication. However, he overall is having significant abdominal pain especially with palpation. He continues to be nauseous and vomited here in the ED shortly before my arrival to his room.         Past Medical History:   Asthma (uses albuterol sparingly)    No other PMH. Specifically denies diabetes, pre-diabetes, thyroid disorder, high blood pressure, heart disease          Past Surgical History:   No history of surgery         Medications:     Prior to Admission medications    Medication Sig Start Date End Date Taking? Authorizing Provider   albuterol (PROAIR  "HFA/PROVENTIL HFA/VENTOLIN HFA) 108 (90 Base) MCG/ACT inhaler Inhale 2 puffs into the lungs every 6 hours as needed for shortness of breath / dyspnea or wheezing Due to weather   Yes Unknown, Entered By History   aspirin (ASA) 81 MG chewable tablet Take 81 mg by mouth daily as needed for moderate pain   Yes Unknown, Entered By History   Multiple Vitamin (MULTIVITAMIN ADULT) TABS Take 1 tablet by mouth daily   Yes Unknown, Entered By History            Allergies:   No Known Allergies         Social History:     Social History     Tobacco Use     Smoking status: Not on file     Smokeless tobacco: Not on file   Substance Use Topics     Alcohol use: Not on file             Family History:   POSITIVE for congestive heart failure (grandmother) and prediabetes (mother). His mother also has a history of gallbladder disease requiring cholecystectomy when she was younger.   This patient otherwise has no pertinent family history, specifically no family history of any bleeding, clotting or anesthesia problems.          Review of Systems:   Brief ROS is negative other than noted in the HPI.  Constitutional: NEGATIVE for fever, chills, change in weight  Respiratory: NEGATIVE for significant cough or SOB  Cardiovascular: NEGATIVE for palpitations or peripheral edema  Gastrointestinal: POSITIVE for abdominal pain, nausea, vomiting  Hematologic: NEGATIVE for bleeding problems         Physical Exam:   Blood pressure 124/85, pulse 73, temperature 98.1  F (36.7  C), temperature source Oral, resp. rate 21, height 1.727 m (5' 8\"), weight 97.1 kg (214 lb), SpO2 95 %.  I/O last 3 completed shifts:  In: 1000 [IV Piggyback:1000]  Out: -     General - This is a well developed, well nourished male appearing slightly uncomfortable. Alert and oriented x 3  Head - normocephalic, atraumatic  Eyes - no scleral icterus, extraocular movements intact  Neck - supple without masses, trachea midline, no lymphadenopathy or thyromegaly  Respiratory - lungs " clear to auscultation bilaterally without wheezes, rales or rhonchi   Cardiovascular - regular rate and rhythm; S1 and S2 distinct without murmur   Abdomen - Soft, nondistended. He is significantly tender to palpation with guarding at his epigastrium and RUQ. No palpable masses or organomegaly. No rebound tenderness. LUQ and remainder of abdomen is nontender.  Extremities - Moves all extremities. Warm without edema. No calf tenderness  Neurologic - Nonfocal          Data:   Labs:  Recent Labs   Lab Test 09/15/22  0407   WBC 5.9   HGB 14.3   HCT 42.7        Recent Labs   Lab Test 09/15/22  0407   POTASSIUM 3.6   CHLORIDE 105   CO2 27   BUN 17   CR 1.10     Recent Labs   Lab Test 09/15/22  0407   BILITOTAL 0.6   ALT 37   AST 20   ALKPHOS 49   LIPASE 108     Recent Labs   Lab Test 09/15/22  0407   GO 9.6     Recent Labs   Lab Test 09/15/22  0407   ANIONGAP 6   ALBUMIN 4.2       Ultrasound of the abdomen:   FINDINGS:     GALLBLADDER: There is a large stone positioned at the gallbladder neck. Gallbladder sludge is additionally present. Gallbladder is upper limits of normal size, without wall thickening or pericholecystic fluid. Technologist reports a negative Liz's   sign.     BILE DUCTS: No biliary dilatation. The common duct measures 5 mm.     LIVER: Diffuse hepatic steatosis.     RIGHT KIDNEY: No hydronephrosis.     PANCREAS: The visualized portions are normal.                                                                      IMPRESSION:  1.  Gallbladder stones/sludge, without sonographic evidence of acute cholecystitis, at this time. Notable large stone lodged at the gallbladder neck.  2.  Hepatic steatosis.    Chest XR:  INDICATION: Chest and abdominal pain.  COMPARISON: None.                                                                      IMPRESSION: Negative chest.           Assessment:   Bryn Power is a 32 year old male with symptomatic cholelithiasis including a large stone lodged  at the gallbladder neck         Plan:   - Recommend laparoscopic cholecystectomy during this hospitalization. Briefly discussed risks, benefits, alternatives, potential complications, and expected recovery with patient (and his wife via phone). They will discuss further with Dr. Smith, but likely wish to proceed with surgery today  - Plan for lap rosetta today. Patient will need to remain in hospital overnight for monitoring as he is from out of town and was here for work  - NPO, IV fluids, pain meds and anti-emetics prn. Activity as tolerated, PCDs while resting  - Care coordinator consult to assist with insurance coverage questions  - No indication for antibiotics  - Medical management per hospitalist  - COVID-19 testing negative     I have discussed the history, physical, and plan with Dr. Smith who will independently interview and examine the patient and update the stated plan as indicated.        Felisha Carranza PA-C  [facility]  [phone]

## 2022-09-15 NOTE — ANESTHESIA CARE TRANSFER NOTE
Patient: Bryn Power    Procedure: Procedure(s):  CHOLECYSTECTOMY, LAPAROSCOPIC       Diagnosis: Symptomatic cholelithiasis [K80.20]  Diagnosis Additional Information: No value filed.    Anesthesia Type:   General     Note:    Oropharynx: oropharynx clear of all foreign objects  Level of Consciousness: awake  Oxygen Supplementation: face mask    Independent Airway: airway patency satisfactory and stable  Dentition: dentition unchanged  Vital Signs Stable: post-procedure vital signs reviewed and stable  Report to RN Given: handoff report given  Patient transferred to: PACU    Handoff Report: Identifed the Patient, Identified the Reponsible Provider, Reviewed the pertinent medical history, Discussed the surgical course, Reviewed Intra-OP anesthesia mangement and issues during anesthesia, Set expectations for post-procedure period and Allowed opportunity for questions and acknowledgement of understanding      Vitals:  Vitals Value Taken Time   /84    Temp     Pulse 73    Resp 12    SpO2 99        Electronically Signed By: FRANCISCO Reyes CRNA  September 15, 2022  2:20 PM

## 2022-09-15 NOTE — PROGRESS NOTES
Non billing PN    Patient s/p lap rosetta:    He is lightly sedated but arouseable    C/o of some soreness, no nausea    Meds reviewed    Patient on diet.    Plan:  Diet as tolerated  SL IVF  Anticipate home tomorrow      Taqueria Garner MD

## 2022-09-15 NOTE — PLAN OF CARE
Goal Outcome Evaluation:  A/OX4, cms intact. Up SBA, voiding well in bathroom. 3 Lap site on abdomen CDI. IV SL. Tolerating clears. IV dilaudid given for pain. Will continue to monitor.

## 2022-09-15 NOTE — ED NOTES
[facility]  ED Nurse Handoff Report    ED Chief complaint: Chest Pain and Abdominal Pain      ED Diagnosis:   Final diagnoses:   Biliary colic       Code Status: Full Code    Allergies: No Known Allergies    Patient Story: Started having chest pain changed to upper left abdominal pain, n/v  Focused Assessment:  Left upper quadrant pain    Treatments and/or interventions provided: 0.5mg dilaudid x3, 4 mg x 2. reglan 10 mg  Patient's response to treatments and/or interventions: Pain improved and nausea slight improvement.     To be done/followed up on inpatient unit:  pain management. NPO    Does this patient have any cognitive concerns?: no    Activity level - Baseline/Home:  Independent  Activity Level - Current:   Independent    Patient's Preferred language: English   Needed?: No    Isolation: None  Infection: Not Applicable  Patient tested for COVID 19 prior to admission: YES  Bariatric?: No    Vital Signs:   Vitals:    09/15/22 0415 09/15/22 0622 09/15/22 0630 09/15/22 0700   BP:  126/87  124/85   Pulse: 56 68 70 73   Resp: 25 20 14 21   Temp:       TempSrc:       SpO2: 97% 98% 93% 95%   Weight:       Height:           Cardiac Rhythm:     Was the PSS-3 completed:   Yes  What interventions are required if any?               Family Comments: no  OBS brochure/video discussed/provided to patient/family: Yes              Name of person given brochure if not patient: no              Relationship to patient: no      For the majority of the shift this patient's behavior was Green.   Behavioral interventions performed were none.    ED NURSE PHONE NUMBER: [phone]

## 2022-09-15 NOTE — ED PROVIDER NOTES
"  History     Chief Complaint:  Chest Pain and Abdominal Pain       HPI   Bryn Power is a 32 year old male who presents with chest pain prior to arrival.  Symptoms started at 1 AM, 2 and half hours prior to arrival..  It woke him up from sleep he describes it as epigastric sharp pain that is nonradiating.  It is constant with no alleviating factors.  He denies shortness of breath denies cough denies nausea vomiting denies sweating.  Denies diarrhea constipation no dysuria or hematuria.    ROS:  Review of Systems   Cardiovascular: Positive for chest pain.   Gastrointestinal: Positive for abdominal pain.   All other systems reviewed and are negative.      Allergies:  No Known Allergies     Medications:    No current outpatient medications on file.      Past Medical History:    No past medical history on file.    Past Surgical History:    No past surgical history on file.     Family History:    family history is not on file.    Social History:     PCP: No Ref-Primary, Physician     Physical Exam   Patient Vitals for the past 24 hrs:   BP Temp Temp src Pulse Resp SpO2 Height Weight   09/15/22 0415 -- -- -- 56 25 97 % -- --   09/15/22 0351 (!) 146/81 98.1  F (36.7  C) Oral 87 20 98 % 1.727 m (5' 8\") 97.1 kg (214 lb)        Physical Exam  Constitutional:  Oriented to person, place, and time.   HENT:   Head:    Normocephalic.   Mouth/Throat:   Oropharynx is clear and moist.   Eyes:    EOM are normal. Pupils are equal, round, and reactive to light.   Neck:    Neck supple.   Cardiovascular:  Normal rate, regular rhythm and normal heart sounds.      Exam reveals no gallop and no friction rub.       No murmur heard.  Pulmonary/Chest:  Effort normal and breath sounds normal.      No respiratory distress. No wheezes. No rales.      No reproducible chest wall pain.  Abdominal:   Soft. No distension.  Epigastric and right upper quadrant tenderness and guarding noted.  Negative Liz sign no rebound.  Musculoskeletal: "  Normal range of motion. 2+ distal equal pulses.  No leg calf tenderness, swelling or edema.    Neurological:   Alert and oriented to person, place, and time.           Moves all 4 extremities spontaneously    Skin:    No rash noted. No pallor.       Emergency Department Course   ECG:  Interpreted by Jose Saravia MD at 3:49 AM on September 15, 2022  Normal sinus rhythm with sinus arrhythmia right bundle branch block.  No acute ischemic EKG changes ventricular rate of 68 QTC of 448.  Interpretation abnormal EKG with no acute ischemic changes.    Imaging:  US Abdomen Limited   Final Result   IMPRESSION:   1.  Gallbladder stones/sludge, without sonographic evidence of acute cholecystitis, at this time. Notable large stone lodged at the gallbladder neck.   2.  Hepatic steatosis.            XR Chest 2 Views   Final Result   IMPRESSION: Negative chest.         Report per radiology    Laboratory:  Labs Ordered and Resulted from Time of ED Arrival to Time of ED Departure   COMPREHENSIVE METABOLIC PANEL - Abnormal       Result Value    Sodium 138      Potassium 3.6      Chloride 105      Carbon Dioxide (CO2) 27      Anion Gap 6      Urea Nitrogen 17      Creatinine 1.10      Calcium 9.6      Glucose 119 (*)     Alkaline Phosphatase 49      AST 20      ALT 37      Protein Total 7.5      Albumin 4.2      Bilirubin Total 0.6      GFR Estimate >90     CBC WITH PLATELETS AND DIFFERENTIAL - Abnormal    WBC Count 5.9      RBC Count 5.50      Hemoglobin 14.3      Hematocrit 42.7      MCV 78      MCH 26.0 (*)     MCHC 33.5      RDW 12.6      Platelet Count 240      % Neutrophils 44      % Lymphocytes 49      % Monocytes 6      % Eosinophils 1      % Basophils 0      % Immature Granulocytes 0      NRBCs per 100 WBC 0      Absolute Neutrophils 2.6      Absolute Lymphocytes 2.8      Absolute Monocytes 0.4      Absolute Eosinophils 0.1      Absolute Basophils 0.0      Absolute Immature Granulocytes 0.0      Absolute NRBCs 0.0      LIPASE - Normal    Lipase 108     TROPONIN I - Normal    Troponin I High Sensitivity <3              Emergency Department Course:             Reviewed:  I reviewed nursing notes, vitals, and past medical history    Assessments:   I obtained history and examined the patient as noted above.    I rechecked the patient and explained findings.   Betty the case with hospitalist where patient will be admitted for observation for further pain control management.           Consults:   Hospitalist    Interventions:  Medications   ondansetron (ZOFRAN) injection 4 mg (4 mg Intravenous Given 9/15/22 0427)   metoclopramide (REGLAN) injection 10 mg (has no administration in time range)   diphenhydrAMINE (BENADRYL) injection 25 mg (has no administration in time range)   0.9% sodium chloride BOLUS (0 mLs Intravenous Stopped 9/15/22 0536)   HYDROmorphone (PF) (DILAUDID) injection 0.5 mg (0.5 mg Intravenous Given 9/15/22 0536)   famotidine (PEPCID) injection 20 mg (20 mg Intravenous Given 9/15/22 035)        Disposition:  The patient was admitted to the hospital under the care of Dr. Virgen.     Impression & Plan        Medical Decision Makin-year-old male who is here complaining of lower chest and epigastric abdominal pain.  Differential is quite wide and includes ACS, gastritis, biliary colic, pancreatitis, PE, or other causes.  Work-up here thus far points towards likely biliary colic as source.  He does have a gallstone within the neck of the gallbladder with a negative sonographic Liz sign.  Laboratory work is otherwise reassuring as well as EKG and troponin here.  After interventions pain is improved although still present and patient is still quite nauseous.  I do believe he would benefit from further pain management and surgery consultation.  As he has no findings of cholecystitis I will hold on antibiotics for now.  Patient will be admitted to observation for further monitoring.        Diagnosis:    ICD-10-CM     1. Biliary colic  K80.50         Discharge Medications:  New Prescriptions    No medications on file        9/15/2022   Jose Saravia MD Shapin, Ryan P, MD  09/15/22 0627

## 2022-09-15 NOTE — ED NOTES
Bed: ED25  Expected date:   Expected time:   Means of arrival:   Comments:  535  32M CP/HTN/Meds  0331

## 2022-09-15 NOTE — ANESTHESIA POSTPROCEDURE EVALUATION
Patient: Bryn Power    Procedure: Procedure(s):  CHOLECYSTECTOMY, LAPAROSCOPIC       Anesthesia Type:  General    Note:  Disposition: Inpatient   Postop Pain Control: Uneventful            Sign Out: Well controlled pain   PONV: No   Neuro/Psych: Uneventful            Sign Out: Acceptable/Baseline neuro status   Airway/Respiratory: Uneventful            Sign Out: Acceptable/Baseline resp. status   CV/Hemodynamics: Uneventful            Sign Out: Acceptable CV status; No obvious hypovolemia; No obvious fluid overload   Other NRE: NONE   DID A NON-ROUTINE EVENT OCCUR? No           Last vitals:  Vitals Value Taken Time   /86 09/15/22 1440   Temp 36.2  C (97.2  F) 09/15/22 1419   Pulse 92 09/15/22 1441   Resp 14 09/15/22 1441   SpO2 97 % 09/15/22 1441   Vitals shown include unvalidated device data.    Electronically Signed By: Trudi Mills MD  September 15, 2022  2:43 PM

## 2022-09-15 NOTE — H&P
[facility]    History and Physical  Hospitalist       Date of Admission:  9/15/2022  Date of Service (when I saw the patient): 09/15/22    Assessment & Plan   Bryn Power is a 32 year old male who presents with abdominal pain    Biliary colic  Gallstones  Woke at 2 hours prior to arrival with abdominal pain. Epigastric, nonradiating, constant. No n/v. No fevers. Afebrile, stable vitals on presentation. CBC, CMP normal. CXR clear. RUQ US without sonographic cholecystitis, notable large stone in gallbladder neck.   - NPO, IV fluids  - prn analgesics, antiemetics  - pantoprazole 40 mg IV daily   - general surgery consult    Asthma  Uses prn albuterol, resume as needed    COVID-19 negative    DVT Prophylaxis: Ambulate every shift  Code Status: Full Code    Disposition: Expected discharge in 1-2 days     Julián Virgen MD, MD  [phone] (P)  Text Page     Primary Care Physician   Physician No Ref-Primary    Chief Complaint   Abdominal pain    History is obtained from the patient and medical records    History of Present Illness   Bryn Power is a 32 year old male who presents with abdominal pain.  Patient reports that this morning he woke up with epigastric discomfort.  He states it stayed in his epigastric region and radiated under his ribs and up into his chest in the epigastric region.  He states the pain became progressively worse and worse prompting his visit.  He initially did not have any nausea and vomiting but did have some here after narcotics.  Denied fevers or chills.  Denied any shortness of breath.  No diaphoresis.  No diarrhea.  Pain has been pretty constant and does come in waves.    Past Medical History    I have reviewed this patient's medical history and updated it with pertinent information if needed.   asthma    Past Surgical History   I have reviewed this patient's surgical history and updated it with pertinent information if needed.  No past  surgical history on file.    Prior to Admission Medications   None     Allergies   No Known Allergies    Social History   I have reviewed this patient's social history and updated it with pertinent information if needed. Bryn Power   denies tobacco, occasional alcohol.     Family History   I have reviewed this patient's family history and updated it with pertinent information if needed.   Diabetes Father      Diabetes Maternal Uncle      Coronary Artery Disease Mother        Family History  Relation Status Comments   Father        Maternal Uncle        Mother          Review of Systems   The 10 point Review of Systems is negative other than noted in the HPI or here.     Physical Exam   Temp: 98.1  F (36.7  C) Temp src: Oral BP: 126/87 Pulse: 68   Resp: 20 SpO2: 98 % O2 Device: None (Room air)    Vital Signs with Ranges  214 lbs 0 oz    Constitutional: alert, oriented and in mild distress from pain  Eyes: EOMI, PERRL  HEENT: OP clear  Respiratory: CTA B without w/c  Cardiovascular: RRR no murmur. no edema.  GI: soft, tender in epigastric region, nondistended, BS present  Lymph/Hematologic: no cervical LAD  Genitourinary: deferred  Skin: no rashes or lesions grossly  Musculoskeletal: no deformities or arthritis  Neurologic: CN II-XII, BOWEN  Psychiatric: mood and affect wnl    Data   Data reviewed today:  I personally reviewed the US  image(s) showing gallstone at bladder neck.  Recent Labs   Lab 09/15/22  0407   WBC 5.9   HGB 14.3   MCV 78         POTASSIUM 3.6   CHLORIDE 105   CO2 27   BUN 17   CR 1.10   ANIONGAP 6   GO 9.6   *   ALBUMIN 4.2   PROTTOTAL 7.5   BILITOTAL 0.6   ALKPHOS 49   ALT 37   AST 20   LIPASE 108       Recent Results (from the past 24 hour(s))   XR Chest 2 Views    Narrative    EXAM: XR CHEST 2 VIEWS  LOCATION: [facility]  DATE/TIME: 9/15/2022 4:37 AM    INDICATION: Chest and abdominal pain.  COMPARISON: None.      Impression    IMPRESSION:  Negative chest.   US Abdomen Limited    Narrative    EXAM: US ABDOMEN LIMITED  LOCATION: [facility]  DATE/TIME: 9/15/2022 5:15 AM    INDICATION: Right upper quadrant pain.  COMPARISON: None.  TECHNIQUE: Limited abdominal ultrasound.    FINDINGS:    GALLBLADDER: There is a large stone positioned at the gallbladder neck. Gallbladder sludge is additionally present. Gallbladder is upper limits of normal size, without wall thickening or pericholecystic fluid. Technologist reports a negative Liz's   sign.    BILE DUCTS: No biliary dilatation. The common duct measures 5 mm.    LIVER: Diffuse hepatic steatosis.    RIGHT KIDNEY: No hydronephrosis.    PANCREAS: The visualized portions are normal.      Impression    IMPRESSION:  1.  Gallbladder stones/sludge, without sonographic evidence of acute cholecystitis, at this time. Notable large stone lodged at the gallbladder neck.  2.  Hepatic steatosis.

## 2022-09-15 NOTE — DISCHARGE INSTRUCTIONS
[facility]  Discharge Instructions: Post-Operative Laparoscopic Cholecystectomy    ACTIVITY  Expect to feel tired after your surgery.  This will gradually resolve.    Take frequent, short walks and increase your activity gradually.    Avoid strenuous physical activity or heavy lifting greater than 15-20 lbs. for 2-3 weeks.  You may climb stairs.  You may drive without restrictions when you are not using any prescription pain medication and feel comfortable in a car.  You may return to work/school when you are comfortable without any prescription pain medication.    WOUND CARE  You may remove your outer dressing or Band-Aids and shower 48 hours after the surgery.  Pat your incisions dry and leave them open to air.  Re-apply dressing (Band-Aids or gauze/tape) as needed for comfort or drainage.  You may have steri-strips (looks like white tape) on your incision.  You may peel off the steri-strips 2 weeks after your surgery if they have not peeled off on their own.   Do not soak your incisions in a tub or pool for 2 weeks.   Do not apply any lotions, creams, or ointments to your incisions.  A ridge under your incisions is normal and will gradually resolve.    DIET  Start with liquids, then gradually resume your regular diet as tolerated.  Avoid heavy, spicy, and greasy meals for 2-3 days.  Drink plenty of fluids to stay hydrated.  It is not uncommon to experience some loose stools or diarrhea after surgery.  This is your body's way of adapting to the bile which will slowly drain into your intestine.  A low fat diet may help with this.  This should improve over 1-2 months.    PAIN  Expect some tenderness and discomfort at the incision sites.  Use the prescribed pain medication at your discretion.  Expect gradual resolution of your pain over several days.  You may take ibuprofen with food (unless you have been told not to) or acetaminophen/Tylenol instead of or in addition to your prescribed  pain medication.  However, if you are taking Norco or Percocet, do not take any additional acetaminophen/Tylenol.  Do not drink alcohol or drive while you are taking pain medications.  You may apply ice to your incisions in 20 minute intervals as needed for the next 48 hours.  After that time, consider switching to heat if you prefer.    EXPECTATIONS  Pain medications can cause constipation.  Limit use when possible.  Take an over the counter or prescribed stool softener/stimulant, such as Colace or Senna, 1-2 times a day with plenty of water.  You may take a mild over the counter laxative, such as Miralax or a suppository, as needed.  You may discontinue these medications once you are having regular bowel movements and/or are no longer taking your narcotic pain medication.    You may have shoulder or upper back discomfort due to the gas used in surgery.  This is temporary and should resolve in 48-72 hours.  Short, frequent walks may help with this.  If you are unable to urinate for 8 hours or feel as though you are not emptying your bladder adequately, we recommend you seek care at an ER or Urgent Care facility for possible catheter placement.     FOLLOW UP  Our office will contact you in approximately 2-3 weeks to check on your progress and answer any questions you may have.  If you are doing well, you will not need to return for a follow up appointment.  If any concerns are identified over the phone, we will help you make an appointment to see a provider.   If you have not received a phone call, have any questions or concerns, or would like to be seen, please call us at [phone] and ask to speak with our nurse.  We are located at [address] [place] [address].    CALL OUR OFFICE [phone] IF YOU HAVE:   Chills or fever above 101 F.  Increased redness, warmth, or drainage at your incisions.  Significant bleeding.  Pain not relieved by your pain medication or rest.  Increasing pain  after the first 48 hours.  Any other concerns or questions.                      Revised December 2021

## 2022-09-15 NOTE — PHARMACY-ADMISSION MEDICATION HISTORY
Pharmacy Medication History  Admission medication history interview status for the 9/15/2022  admission is complete. See EPIC admission navigator for prior to admission medications     Location of Interview: Patient room  Medication history sources: Patient and Care Everywhere    Significant changes made to the medication list:  Added:   - albuterol 108mcg/act inhaler 2 puffs every 6 hours prn shortness of breath/wheezing due to weather, per pt & Care Everywhere   - multivitamin tablet daily   - aspirin 81mg chewable tablet daily prn chest pain    In the past week, patient estimated taking medication this percent of the time: greater than 90%    Additional medication history information:   - Patient states PCP instructed him to take aspirin 81mg as needed when experiencing chest pain, last taken yesterday    Medication reconciliation completed by provider prior to medication history? No    Time spent in this activity: 10 minutes    Prior to Admission medications    Medication Sig Last Dose Taking? Auth Provider Long Term End Date   albuterol (PROAIR HFA/PROVENTIL HFA/VENTOLIN HFA) 108 (90 Base) MCG/ACT inhaler Inhale 2 puffs into the lungs every 6 hours as needed for shortness of breath / dyspnea or wheezing  Yes Unknown, Entered By History Yes    aspirin (ASA) 81 MG chewable tablet Take 81 mg by mouth daily as needed for moderate pain  Yes Unknown, Entered By History     Multiple Vitamin (MULTIVITAMIN ADULT) TABS Take 1 tablet by mouth daily  Yes Unknown, Entered By History       The information provided in this note is only as accurate as the sources available at the time of update(s)

## 2022-09-15 NOTE — LETTER
[facility]  [address] [facility] [address]  [phone]          September 16, 2022    RE:  Bryn Power                                                                                                                                                        [address] [facility] [address]            To whom it may concern:    Bryn Power is under my professional care for    Biliary colic  Symptomatic cholelithiasis  Calculus of gallbladder with acute cholecystitis without obstruction     He required urgent surgery on 9/15/22.  Please plan for him to be off work for 3 weeks, with a return date of 10/7/22.  We anticipate he will be able to work without restrictions at that time.      Sincerely,        Kanu Martinez PA-C  [phone]    
home

## 2022-09-15 NOTE — ANESTHESIA PROCEDURE NOTES
Airway       Patient location during procedure: OR       Procedure Start/Stop Times: 9/15/2022 1:15 PM  Staff -        Anesthesiologist:  Trudi Mills MD       CRNA: Jillian Partida APRN CRNA       Performed By: CRNAIndications and Patient Condition       Indications for airway management: greg-procedural       Induction type:intravenous       Mask difficulty assessment: 1 - vent by mask    Final Airway Details       Final airway type: endotracheal airway       Successful airway: ETT - single  Endotracheal Airway Details        ETT size (mm): 8.0       Cuffed: yes       Successful intubation technique: direct laryngoscopy       DL Blade Type: Lazo 2       Adjucts: stylet       Position: Left       Measured from: gums/teeth       Secured at (cm): 23       Bite block used: None    Post intubation assessment        Placement verified by: capnometry, equal breath sounds and chest rise        Number of attempts at approach: 1       Secured with: pink tape       Ease of procedure: easy       Dentition: Intact and Unchanged    Medication(s) Administered   Medication Administration Time: 9/15/2022 1:15 PM

## 2022-09-15 NOTE — BRIEF OP NOTE
[facility]    Brief Operative Note    Pre-operative diagnosis: Symptomatic cholelithiasis [K80.20]  Post-operative diagnosis Acute Cholecystitis with Cholelithiasis    Procedure: Procedure(s):  CHOLECYSTECTOMY, LAPAROSCOPIC     Surgeon: Surgeon(s) and Role:     * Juan Smith MD - Primary     * Kanu Martinez PA-C - Assisting     Anesthesia: General   Estimated Blood Loss: 10 mL from 9/15/2022  1:06 PM to 9/15/2022  2:17 PM      Drains: None  Specimens:   ID Type Source Tests Collected by Time Destination   1 : GALLBLADDER AND CONTENTS Tissue Gallbladder SURGICAL PATHOLOGY EXAM Juan Smith MD 9/15/2022  1:54 PM      Findings:   None.  Gallbladder grossly inflamed.  Drained intraoperatively and bile appeared normal.  Large gallstone noted.    Complications: None.  Implants: * No implants in log *

## 2022-09-16 VITALS
HEIGHT: 68 IN | WEIGHT: 214 LBS | OXYGEN SATURATION: 96 % | SYSTOLIC BLOOD PRESSURE: 106 MMHG | RESPIRATION RATE: 16 BRPM | TEMPERATURE: 97.7 F | DIASTOLIC BLOOD PRESSURE: 68 MMHG | HEART RATE: 61 BPM | BODY MASS INDEX: 32.43 KG/M2

## 2022-09-16 LAB
PATH REPORT.COMMENTS IMP SPEC: NORMAL
PATH REPORT.COMMENTS IMP SPEC: NORMAL
PATH REPORT.FINAL DX SPEC: NORMAL
PATH REPORT.GROSS SPEC: NORMAL
PATH REPORT.MICROSCOPIC SPEC OTHER STN: NORMAL
PATH REPORT.RELEVANT HX SPEC: NORMAL
PHOTO IMAGE: NORMAL

## 2022-09-16 PROCEDURE — 250N000013 HC RX MED GY IP 250 OP 250 PS 637: Performed by: INTERNAL MEDICINE

## 2022-09-16 PROCEDURE — 99217 PR OBSERVATION CARE DISCHARGE: CPT | Performed by: INTERNAL MEDICINE

## 2022-09-16 PROCEDURE — 96375 TX/PRO/DX INJ NEW DRUG ADDON: CPT | Mod: XU

## 2022-09-16 PROCEDURE — 88304 TISSUE EXAM BY PATHOLOGIST: CPT | Mod: 26 | Performed by: PATHOLOGY

## 2022-09-16 PROCEDURE — 250N000013 HC RX MED GY IP 250 OP 250 PS 637: Performed by: PHYSICIAN ASSISTANT

## 2022-09-16 PROCEDURE — C9113 INJ PANTOPRAZOLE SODIUM, VIA: HCPCS | Performed by: INTERNAL MEDICINE

## 2022-09-16 PROCEDURE — 250N000011 HC RX IP 250 OP 636: Performed by: INTERNAL MEDICINE

## 2022-09-16 PROCEDURE — G0378 HOSPITAL OBSERVATION PER HR: HCPCS

## 2022-09-16 RX ORDER — ONDANSETRON 4 MG/1
4 TABLET, ORALLY DISINTEGRATING ORAL EVERY 8 HOURS PRN
Qty: 10 TABLET | Refills: 0 | Status: SHIPPED | OUTPATIENT
Start: 2022-09-16

## 2022-09-16 RX ORDER — AMOXICILLIN 250 MG
1 CAPSULE ORAL 2 TIMES DAILY PRN
Qty: 5 TABLET | Refills: 0 | Status: SHIPPED | OUTPATIENT
Start: 2022-09-16

## 2022-09-16 RX ADMIN — PANTOPRAZOLE SODIUM 40 MG: 40 INJECTION, POWDER, FOR SOLUTION INTRAVENOUS at 09:11

## 2022-09-16 RX ADMIN — OXYCODONE HYDROCHLORIDE 5 MG: 5 TABLET ORAL at 11:26

## 2022-09-16 RX ADMIN — ACETAMINOPHEN 650 MG: 325 TABLET, FILM COATED ORAL at 11:26

## 2022-09-16 ASSESSMENT — ACTIVITIES OF DAILY LIVING (ADL)
ADLS_ACUITY_SCORE: 35

## 2022-09-16 NOTE — PLAN OF CARE
Goal Outcome Evaluation:  A&O, VSS on RA, Oxycosone and Tylenol x 1 for 5/10 surgical pain, Independent in transfers, continent, on low fat diet, tolerating, discharge instruction done, patient and spouse acknowledged understanding.

## 2022-09-16 NOTE — DISCHARGE SUMMARY
[facility]  Hospitalist Discharge Summary      Date of Admission:  9/15/2022  Date of Discharge:  9/16/2022 12:29 PM  Discharging Provider: Tristin Angel DO  Discharge Service: Hospitalist Service    Discharge Diagnoses   Biliary colic 2/2 gallstones s/p lap rosetta  Asthma     Follow-ups Needed After Discharge   Follow-up Appointments     Follow-up and recommended labs and tests       Follow up with PCP as needed.  Surgery office will call you in ~2 weeks   to check recovery progress.  Call our clinic with any concerns prior to   this call.         Follow-up and recommended labs and tests       Will follow up via phone call at 2 weeks to check recovery.           Unresulted Labs Ordered in the Past 30 Days of this Admission     Date and Time Order Name Status Description    9/15/2022  1:55 PM Surgical Pathology Exam In process       These results will be followed up by PCP     Discharge Disposition   Discharged to home  Condition at discharge: Stable    Hospital Course   Patient seen by surgery and gallbladder removed without difficulty.  Discharge instructions per surgical service.  Discharged in stable condition.      Consultations This Hospital Stay   SURGERY GENERAL IP CONSULT  CARE MANAGEMENT / SOCIAL WORK IP CONSULT    Code Status   Full Code    Time Spent on this Encounter   I, Tristin Agnel DO, personally saw the patient today and spent less than or equal to 30 minutes discharging this patient.       DO [facility]  [address] [place] [address]  Phone: [phone]  Fax: [phone]  ______________________________________________________________________    Physical Exam   Vital Signs: Temp: 97.7  F (36.5  C) Temp src: Oral BP: 106/68 Pulse: 61   Resp: 16 SpO2: 96 % O2 Device: None (Room air) Oxygen Delivery: 2 LPM  Weight: 214 lbs 0 oz  General Appearance: Resting comfortably. NAD   Respiratory: Clear to  auscultation.  No respiratory distress  Cardiovascular: RRR.  No obvious murmurs  GI: Soft. Non-distended   Skin: No obvious rashes or cyanosis  Other: Alert.  No significant edema noted         Primary Care Physician   Physician No Ref-Primary    Discharge Orders      Reason for your hospital stay    Gallbladder surgery     Follow-up and recommended labs and tests     Follow up with PCP as needed.  Surgery office will call you in ~2 weeks to check recovery progress.  Call our clinic with any concerns prior to this call.     Activity    Your activity upon discharge: activity as tolerated, no driving while on analgesics, and no heavy lifting for 2-3 weeks     Reason for your hospital stay    Gallbladder Surgery     Follow-up and recommended labs and tests     Will follow up via phone call at 2 weeks to check recovery.     Activity    Your activity upon discharge: activity as tolerated, no driving while on analgesics, and no heavy lifting for 3 weeks     Diet    Follow this diet upon discharge: Advance your diet as you tolerate.  Push fluids.  Avoid fried foods and foods high in fat for the first two weeks, then gradually can reintroduce these into your diet.     Diet    Follow this diet upon discharge: Push fluids first 48 hours.  Advance diet as tolerated to Low Fat Diet.  Gradually introduce fried foods or foods high in fat after first 2 weeks.       Significant Results and Procedures   Most Recent 3 CBC's:Recent Labs   Lab Test 09/15/22  0407   WBC 5.9   HGB 14.3   MCV 78        Most Recent 3 BMP's:Recent Labs   Lab Test 09/15/22  0407      POTASSIUM 3.6   CHLORIDE 105   CO2 27   BUN 17   CR 1.10   ANIONGAP 6   GO 9.6   *     Most Recent 2 LFT's:Recent Labs   Lab Test 09/15/22  0407   AST 20   ALT 37   ALKPHOS 49   BILITOTAL 0.6   ,   Results for orders placed or performed during the hospital encounter of 09/15/22   US Abdomen Limited    Narrative    EXAM: US ABDOMEN LIMITED  LOCATION: [facility]  DATE/TIME: 9/15/2022 5:15 AM    INDICATION: Right upper quadrant pain.  COMPARISON: None.  TECHNIQUE: Limited abdominal ultrasound.    FINDINGS:    GALLBLADDER: There is a large stone positioned at the gallbladder neck. Gallbladder sludge is additionally present. Gallbladder is upper limits of normal size, without wall thickening or pericholecystic fluid. Technologist reports a negative Liz's   sign.    BILE DUCTS: No biliary dilatation. The common duct measures 5 mm.    LIVER: Diffuse hepatic steatosis.    RIGHT KIDNEY: No hydronephrosis.    PANCREAS: The visualized portions are normal.      Impression    IMPRESSION:  1.  Gallbladder stones/sludge, without sonographic evidence of acute cholecystitis, at this time. Notable large stone lodged at the gallbladder neck.  2.  Hepatic steatosis.       XR Chest 2 Views    Narrative    EXAM: XR CHEST 2 VIEWS  LOCATION: [facility]  DATE/TIME: 9/15/2022 4:37 AM    INDICATION: Chest and abdominal pain.  COMPARISON: None.      Impression    IMPRESSION: Negative chest.       Discharge Medications   Current Discharge Medication List      START taking these medications    Details   ondansetron (ZOFRAN ODT) 4 MG ODT tab Take 1 tablet (4 mg) by mouth every 8 hours as needed for nausea  Qty: 10 tablet, Refills: 0    Associated Diagnoses: Calculus of gallbladder with acute cholecystitis without obstruction      oxyCODONE (ROXICODONE) 5 MG tablet Take 1 tablet (5 mg) by mouth every 4 hours as needed for moderate to severe pain  Qty: 10 tablet, Refills: 0    Associated Diagnoses: Calculus of gallbladder with acute cholecystitis without obstruction      senna-docusate (SENOKOT-S/PERICOLACE) 8.6-50 MG tablet Take 1 tablet by mouth 2 times daily as needed for constipation (while on opioids)  Qty: 5 tablet, Refills: 0    Associated Diagnoses: Calculus of gallbladder with acute cholecystitis without obstruction         CONTINUE  these medications which have NOT CHANGED    Details   albuterol (PROAIR HFA/PROVENTIL HFA/VENTOLIN HFA) 108 (90 Base) MCG/ACT inhaler Inhale 2 puffs into the lungs every 6 hours as needed for shortness of breath / dyspnea or wheezing Due to weather      aspirin (ASA) 81 MG chewable tablet Take 81 mg by mouth daily as needed for moderate pain      Multiple Vitamin (MULTIVITAMIN ADULT) TABS Take 1 tablet by mouth daily           Allergies   No Known Allergies

## 2022-09-16 NOTE — PLAN OF CARE
Goal Outcome Evaluation:        Pt A&Ox4. VSS-Ra. CMS intact. Independent. IV SL. Clear liquid, low fat diet. Scant drainage on lap sites, reinforced with 4x4. Voiding via BR. Pain managed with tylenol. Expected discharge today. Continue to monitor.

## 2022-09-29 ENCOUNTER — TELEPHONE (OUTPATIENT)
Dept: SURGERY | Facility: CLINIC | Age: 32
End: 2022-09-29

## 2022-09-29 NOTE — TELEPHONE ENCOUNTER
[facility]  Post op call note     Bryn Power was called for an update regarding his recovery.  He underwent a Lap Eleanor by Dr. Smith on September / 15 / 2022.  Today he tells me he is doing well and denies any complaints.  He is eating a normal diet and his bowels are regular.  He states his wounds are healing well and denies any erythema or drainage at his wounds.     The pathology revealed:  -Acute cholecystitis, and cholelithiasis.  -Negative for dysplasia or malignancy..    This was discussed with the patient.     He was instructed to gradually resume all normal activities. The patient states all of his questions were answered and he agrees to follow up in clinic as needed.  He does not have a PCP in [place], so would have to be seen in UC or ED if concerns.  Encouraged him to consider establishing PCP in future.  He plans to return to work as  on 10/10, so this will be over 3 weeks from surgery date and he should tolerate this pretty well by then.  He will call if concerns.    Silverio Martinez PA-C        Please route or send letter to:  Primary Care Provider (PCP)

## (undated) DEVICE — ENDO TROCAR SLEEVE KII Z-THREADED 05X100MM CTS02

## (undated) DEVICE — PREP CHLORAPREP 26ML TINTED HI-LITE ORANGE 930815

## (undated) DEVICE — SOL WATER IRRIG 1000ML BOTTLE 2F7114

## (undated) DEVICE — LINEN TOWEL PACK X5 5464

## (undated) DEVICE — GOWN IMPERVIOUS SPECIALTY XLG/XLONG 32474

## (undated) DEVICE — ENDO TROCAR FIRST ENTRY KII FIOS Z-THRD 05X100MM CTF03

## (undated) DEVICE — GLOVE PROTEXIS W/NEU-THERA 8.0  2D73TE80

## (undated) DEVICE — POUCH TISSUE RETRIEVAL LAP 10MM 2.21" INTRO TRS100SB2

## (undated) DEVICE — ENDO SCOPE WARMER LF TM500

## (undated) DEVICE — PACK LAP CHOLE SLC15LCFSD

## (undated) DEVICE — SU VICRYL 0 UR-6 27" J603H

## (undated) DEVICE — EVAC SYSTEM CLEAR FLOW SC082500

## (undated) DEVICE — SUCTION IRR STRYKERFLOW II W/TIP 250-070-520

## (undated) DEVICE — ESU HOLDER LAP INST DISP PURPLE LONG 330MM H-PRO-330

## (undated) DEVICE — ANTIFOG SOLUTION W/FOAM PAD 31142527

## (undated) DEVICE — SU VICRYL 4-0 PS-2 18" UND J496H

## (undated) DEVICE — APPLIER ENDO CLIP APPLIED MED UNIVERSAL 5MMX34CM LF CA500

## (undated) DEVICE — ESU GROUND PAD UNIVERSAL W/O CORD

## (undated) DEVICE — BLADE CLIPPER SGL USE 9680

## (undated) DEVICE — DECANTER VIAL 2006S

## (undated) DEVICE — NDL INSUFFLATION 13GA 120MM C2201

## (undated) DEVICE — ENDO TROCAR FIRST ENTRY KII FIOS Z-THRD 11X100MM CTF33

## (undated) RX ORDER — GLYCOPYRROLATE 0.2 MG/ML
INJECTION, SOLUTION INTRAMUSCULAR; INTRAVENOUS
Status: DISPENSED
Start: 2022-09-15

## (undated) RX ORDER — LIDOCAINE HYDROCHLORIDE 20 MG/ML
INJECTION, SOLUTION EPIDURAL; INFILTRATION; INTRACAUDAL; PERINEURAL
Status: DISPENSED
Start: 2022-09-15

## (undated) RX ORDER — FENTANYL CITRATE 50 UG/ML
INJECTION, SOLUTION INTRAMUSCULAR; INTRAVENOUS
Status: DISPENSED
Start: 2022-09-15

## (undated) RX ORDER — DEXAMETHASONE SODIUM PHOSPHATE 4 MG/ML
INJECTION, SOLUTION INTRA-ARTICULAR; INTRALESIONAL; INTRAMUSCULAR; INTRAVENOUS; SOFT TISSUE
Status: DISPENSED
Start: 2022-09-15

## (undated) RX ORDER — ALBUTEROL SULFATE 90 UG/1
AEROSOL, METERED RESPIRATORY (INHALATION)
Status: DISPENSED
Start: 2022-09-15

## (undated) RX ORDER — BUPIVACAINE HYDROCHLORIDE AND EPINEPHRINE 2.5; 5 MG/ML; UG/ML
INJECTION, SOLUTION EPIDURAL; INFILTRATION; INTRACAUDAL; PERINEURAL
Status: DISPENSED
Start: 2022-09-15

## (undated) RX ORDER — PROPOFOL 10 MG/ML
INJECTION, EMULSION INTRAVENOUS
Status: DISPENSED
Start: 2022-09-15

## (undated) RX ORDER — FENTANYL CITRATE 0.05 MG/ML
INJECTION, SOLUTION INTRAMUSCULAR; INTRAVENOUS
Status: DISPENSED
Start: 2022-09-15

## (undated) RX ORDER — NEOSTIGMINE METHYLSULFATE 1 MG/ML
VIAL (ML) INJECTION
Status: DISPENSED
Start: 2022-09-15